# Patient Record
(demographics unavailable — no encounter records)

---

## 2025-01-08 NOTE — ASSESSMENT
[FreeTextEntry1] : Patient doing well blood pressure controlled with lisinopril will continue will check labs

## 2025-01-08 NOTE — HEALTH RISK ASSESSMENT
[Very Good] : ~his/her~  mood as very good [No] : No [No falls in past year] : Patient reported no falls in the past year [PHQ-2 Negative - No further assessment needed] : PHQ-2 Negative - No further assessment needed [Former] : Former [> 15 Years] : > 15 Years [Patient reported mammogram was normal] : Patient reported mammogram was normal [Patient reported colonoscopy was normal] : Patient reported colonoscopy was normal [Change in mental status noted] : No change in mental status noted [None] : None [Alone] : lives alone [] :  [Reports changes in hearing] : Reports no changes in hearing [Reports changes in vision] : Reports no changes in vision [Seat Belt] :  uses seat belt [MammogramDate] : 07/23 [ColonoscopyDate] : 08/18 [de-identified] : Niece and nephew live on the second floor [With Patient/Caregiver] : , with patient/caregiver [Designated Healthcare Proxy] : Designated healthcare proxy [Relationship: ___] : Relationship: [unfilled] [Aggressive treatment] : aggressive treatment [I will adhere to the patient's wishes.] : I will adhere to the patient's wishes. [Time Spent: ___ minutes] : Time Spent: [unfilled] minutes [AdvancecareDate] : 01/25 [FreeTextEntry4] : 16 minutes spent discussing end-of-life care and options for treatment such as DNR DNI dialysis tube feeding if patient becomes unable to make decisions for himself in a situation where treatment outweighs benefits

## 2025-01-08 NOTE — HISTORY OF PRESENT ILLNESS
[FreeTextEntry1] : Medicare annual [de-identified] : Patient seen pain management for low back pain having epidural steroids and gel shots to her knees.  She is able to ambulate and perform activities of daily living  Hypertension well-controlled on lisinopril 10 mg

## 2025-07-09 NOTE — HISTORY OF PRESENT ILLNESS
[de-identified] : Hypertension well-controlled on current med lisinopril 10  Had painful swollen left ankle upon cruise several weeks ago ankle swelling has resolved  Physical exam some pain with pronation of the ankle slight ecchymosis and tenderness over lateral collateral ligaments no calf swelling no calf tenderness